# Patient Record
Sex: FEMALE | Race: WHITE | Employment: OTHER | URBAN - METROPOLITAN AREA
[De-identification: names, ages, dates, MRNs, and addresses within clinical notes are randomized per-mention and may not be internally consistent; named-entity substitution may affect disease eponyms.]

---

## 2024-03-08 ENCOUNTER — ANESTHESIA (OUTPATIENT)
Dept: ENDOSCOPY | Age: 79
End: 2024-03-08
Payer: MEDICARE

## 2024-03-08 ENCOUNTER — ANESTHESIA EVENT (OUTPATIENT)
Dept: ENDOSCOPY | Age: 79
End: 2024-03-08
Payer: MEDICARE

## 2024-03-08 ENCOUNTER — HOSPITAL ENCOUNTER (OUTPATIENT)
Age: 79
Setting detail: OUTPATIENT SURGERY
Discharge: HOME | End: 2024-03-08
Attending: INTERNAL MEDICINE | Admitting: INTERNAL MEDICINE
Payer: MEDICARE

## 2024-03-08 VITALS
HEART RATE: 79 BPM | DIASTOLIC BLOOD PRESSURE: 83 MMHG | HEIGHT: 63 IN | OXYGEN SATURATION: 97 % | TEMPERATURE: 97.5 F | BODY MASS INDEX: 46.07 KG/M2 | SYSTOLIC BLOOD PRESSURE: 160 MMHG | WEIGHT: 260 LBS | RESPIRATION RATE: 16 BRPM

## 2024-03-08 DIAGNOSIS — K30 FUNCTIONAL DYSPEPSIA: ICD-10-CM

## 2024-03-08 DIAGNOSIS — Z86.010 HISTORY OF COLON POLYPS: ICD-10-CM

## 2024-03-08 PROCEDURE — 3700000000 HC ANESTHESIA ATTENDED CARE: Performed by: INTERNAL MEDICINE

## 2024-03-08 PROCEDURE — 2709999900 HC NON-CHARGEABLE SUPPLY: Performed by: INTERNAL MEDICINE

## 2024-03-08 PROCEDURE — 3609012400 HC EGD TRANSORAL BIOPSY SINGLE/MULTIPLE: Performed by: INTERNAL MEDICINE

## 2024-03-08 PROCEDURE — 88305 TISSUE EXAM BY PATHOLOGIST: CPT

## 2024-03-08 PROCEDURE — 7100000010 HC PHASE II RECOVERY - FIRST 15 MIN: Performed by: INTERNAL MEDICINE

## 2024-03-08 PROCEDURE — 6360000002 HC RX W HCPCS: Performed by: NURSE ANESTHETIST, CERTIFIED REGISTERED

## 2024-03-08 PROCEDURE — 3609010600 HC COLONOSCOPY POLYPECTOMY SNARE/COLD BIOPSY: Performed by: INTERNAL MEDICINE

## 2024-03-08 PROCEDURE — 2580000003 HC RX 258: Performed by: ANESTHESIOLOGY

## 2024-03-08 PROCEDURE — 7100000011 HC PHASE II RECOVERY - ADDTL 15 MIN: Performed by: INTERNAL MEDICINE

## 2024-03-08 PROCEDURE — 2500000003 HC RX 250 WO HCPCS: Performed by: NURSE ANESTHETIST, CERTIFIED REGISTERED

## 2024-03-08 PROCEDURE — 3700000001 HC ADD 15 MINUTES (ANESTHESIA): Performed by: INTERNAL MEDICINE

## 2024-03-08 RX ORDER — PROPOFOL 10 MG/ML
INJECTION, EMULSION INTRAVENOUS CONTINUOUS PRN
Status: DISCONTINUED | OUTPATIENT
Start: 2024-03-08 | End: 2024-03-08 | Stop reason: SDUPTHER

## 2024-03-08 RX ORDER — SODIUM CHLORIDE, SODIUM LACTATE, POTASSIUM CHLORIDE, CALCIUM CHLORIDE 600; 310; 30; 20 MG/100ML; MG/100ML; MG/100ML; MG/100ML
INJECTION, SOLUTION INTRAVENOUS CONTINUOUS
Status: DISCONTINUED | OUTPATIENT
Start: 2024-03-08 | End: 2024-03-08 | Stop reason: HOSPADM

## 2024-03-08 RX ORDER — SERTRALINE HYDROCHLORIDE 100 MG/1
150 TABLET, FILM COATED ORAL
COMMUNITY

## 2024-03-08 RX ORDER — SOLIFENACIN SUCCINATE 10 MG/1
10 TABLET, FILM COATED ORAL DAILY
COMMUNITY
Start: 2024-01-12

## 2024-03-08 RX ORDER — UBIDECARENONE 100 MG
100 CAPSULE ORAL DAILY
COMMUNITY

## 2024-03-08 RX ORDER — PROPOFOL 10 MG/ML
INJECTION, EMULSION INTRAVENOUS PRN
Status: DISCONTINUED | OUTPATIENT
Start: 2024-03-08 | End: 2024-03-08 | Stop reason: SDUPTHER

## 2024-03-08 RX ORDER — VALSARTAN 80 MG/1
80 TABLET ORAL NIGHTLY
COMMUNITY

## 2024-03-08 RX ORDER — MELOXICAM 15 MG/1
15 TABLET ORAL DAILY
COMMUNITY
Start: 2023-11-24

## 2024-03-08 RX ORDER — LIDOCAINE HYDROCHLORIDE 20 MG/ML
INJECTION, SOLUTION EPIDURAL; INFILTRATION; INTRACAUDAL; PERINEURAL PRN
Status: DISCONTINUED | OUTPATIENT
Start: 2024-03-08 | End: 2024-03-08 | Stop reason: SDUPTHER

## 2024-03-08 RX ORDER — ROSUVASTATIN CALCIUM 10 MG/1
10 TABLET, COATED ORAL EVERY MORNING
COMMUNITY
Start: 2024-02-11

## 2024-03-08 RX ORDER — MILK THISTLE 150 MG
CAPSULE ORAL DAILY
COMMUNITY

## 2024-03-08 RX ADMIN — PROPOFOL 20 MG: 10 INJECTION, EMULSION INTRAVENOUS at 10:09

## 2024-03-08 RX ADMIN — SODIUM CHLORIDE, POTASSIUM CHLORIDE, SODIUM LACTATE AND CALCIUM CHLORIDE: 600; 310; 30; 20 INJECTION, SOLUTION INTRAVENOUS at 08:42

## 2024-03-08 RX ADMIN — PROPOFOL 20 MG: 10 INJECTION, EMULSION INTRAVENOUS at 10:12

## 2024-03-08 RX ADMIN — PROPOFOL 20 MG: 10 INJECTION, EMULSION INTRAVENOUS at 10:04

## 2024-03-08 RX ADMIN — PROPOFOL 75 MCG/KG/MIN: 10 INJECTION, EMULSION INTRAVENOUS at 09:48

## 2024-03-08 RX ADMIN — PROPOFOL 50 MG: 10 INJECTION, EMULSION INTRAVENOUS at 09:48

## 2024-03-08 RX ADMIN — PROPOFOL 10 MG: 10 INJECTION, EMULSION INTRAVENOUS at 10:07

## 2024-03-08 RX ADMIN — PROPOFOL 20 MG: 10 INJECTION, EMULSION INTRAVENOUS at 10:14

## 2024-03-08 RX ADMIN — LIDOCAINE HYDROCHLORIDE 50 MG: 20 INJECTION, SOLUTION EPIDURAL; INFILTRATION; INTRACAUDAL; PERINEURAL at 09:48

## 2024-03-08 RX ADMIN — PROPOFOL 20 MG: 10 INJECTION, EMULSION INTRAVENOUS at 10:16

## 2024-03-08 RX ADMIN — PROPOFOL 50 MG: 10 INJECTION, EMULSION INTRAVENOUS at 09:50

## 2024-03-08 ASSESSMENT — PAIN - FUNCTIONAL ASSESSMENT: PAIN_FUNCTIONAL_ASSESSMENT: 0-10

## 2024-03-08 NOTE — ANESTHESIA PRE PROCEDURE
07/21/2014 09:00 AM    ALKPHOS 63 07/21/2014 09:00 AM    AST 19 07/21/2014 09:00 AM    ALT 20 07/21/2014 09:00 AM       POC Tests: No results for input(s): \"POCGLU\", \"POCNA\", \"POCK\", \"POCCL\", \"POCBUN\", \"POCHEMO\", \"POCHCT\" in the last 72 hours.    Coags: No results found for: \"PROTIME\", \"INR\", \"APTT\"    HCG (If Applicable): No results found for: \"PREGTESTUR\", \"PREGSERUM\", \"HCG\", \"HCGQUANT\"     ABGs: No results found for: \"PHART\", \"PO2ART\", \"YAB0IDI\", \"MKN4YMS\", \"BEART\", \"T4PJJYFY\"     Type & Screen (If Applicable):  No results found for: \"LABABO\", \"LABRH\"    Drug/Infectious Status (If Applicable):  No results found for: \"HIV\", \"HEPCAB\"    COVID-19 Screening (If Applicable): No results found for: \"COVID19\"        Anesthesia Evaluation  Patient summary reviewed and Nursing notes reviewed  Airway: Mallampati: II  TM distance: >3 FB   Neck ROM: full  Mouth opening: > = 3 FB   Dental: normal exam         Pulmonary:Negative Pulmonary ROS and normal exam                               Cardiovascular:  Exercise tolerance: good (>4 METS)  (+) hypertension:, hyperlipidemia                  Neuro/Psych:   Negative Neuro/Psych ROS              GI/Hepatic/Renal:   (+) morbid obesity          Endo/Other:    (+) : arthritis: OA..                 Abdominal:             Vascular: negative vascular ROS.         Other Findings:             Anesthesia Plan      MAC     ASA 3       Induction: intravenous.      Anesthetic plan and risks discussed with patient.      Plan discussed with CRNA.    Attending anesthesiologist reviewed and agrees with Preprocedure content                JACE DANIELS MD   3/8/2024

## 2024-03-08 NOTE — PROCEDURES
EGD/COLONOSCOPY     Patient: Brii Ferrari MRN: [id number]   YOB: 1945 Age: 79 y.o. Sex: female   Unit: [facility] ENDOSCOPY Room/Bed: Endo Pool/NONE Location: [facility]    Admitting Physician: LAIHSA RADFORD     Primary Care Physician: Merritt Montenegro MD      DATE OF PROCEDURE: 3/8/2024  PROCEDURE: EGD/Colonoscopy    PREOPERATIVE DIAGNOSIS: History of colon polyps [Z86.010]  Functional dyspepsia [K30]  HPI: Diagnostic EGD for generalized abd pain.  Colonoscopy for surveillance of high risk colon polyp.      Brii has history of depression, HTN, umbilical hernia, right bundle branch block and low risk colon polyps.  I met her recently with complaints of abdominal discomfort, belching, change in bowel habits.         11/20 colonoscopy by Dr. Perez 2 with 2 small polyps, 2 medium polyps and a large 1.2 cm ascending colon polyp with 3-year recall recommended.  No previous EGD.           We met her a month ago in the office.  for the last 2 years, she awakes with nausea, abd distention, burping, worse after a meal and at night. worse with anxiety. for the last 5 or so years, also has alternating constipation and diarrhea. typically, she has BM every 3-4 days formed or hard. occasional urgent loose or watery.  We started MiraLAX.         Takes meloxicam or ibuprofen and asa. take cimetadine 300mg bid OTC for upper abd discomfort, and it can help.         pshx: rt knee replacement. remote abdominoplasty, abd wall hernia repair. partial hysterectomy and rectocele repair.         fhx: daughter sigmoid volvulus. no other GI disease         shx: non smoker. occ etoh.     ENDOSCOPIST: LAISHA RADFORD MD    POSTOPERATIVE DIAGNOSIS:    -Esophagus notable for LA grade a reflux esophagitis.  -Stomach with small hiatal hernia.  Mild nonerosive antritis, biopsied for H. pylori.  -Duodenum unremarkable to the third portion with biopsies from D2-D3 to rule out celiac.  -Tortuous and technically  Intubated: Yes    Findings:   The esophagus was notable for mild LA grade a reflux esophagitis at the GE junction, which was the same level as the Z-line.  There is no obvious Viera's esophagus.  The diaphragm was about 2 cm distal.  In the stomach, aside from the hiatal hernia, the cardia, fundus, gastric body were unremarkable.  In the antrum, there was mild erythema with a few scattered small erosions.  Biopsies were obtained from the antrum and incisura to rule out H. pylori.  In the duodenum, the first second and third portions were unremarkable.  Biopsies were obtained from D2 and D3 to rule out celiac sprue.  The colon was technically challenging to navigate because of looping in the left colon and tortuosity in the transverse colon.  This was overcome by keeping the scope straight as possible and then in the transverse colon, applying targeted pressure in the super pubic area.  The cecum including the appendix and ileocecal valve were grossly normal.  In the transverse colon, there were 3 polyps ranging in size from 3 to 6 mm, removed and retrieved with a cold snare.  There was moderate left-sided diverticulosis and there were small internal and external hemorrhoids.  The ascending, transverse, descending, sigmoid and rectum were otherwise unremarkable.    Estimated Blood Loss:  Minimal  Complications: None    Signed By: LAISHA RADFORD MD

## 2024-03-08 NOTE — PROGRESS NOTES
03/08/24 0753   Encounter Summary   Encounter Overview/Reason  Initial Encounter   Service Provided For: Patient   Support System Children   Last Encounter  03/08/24  (MSR-  provided spiritual health initial visit. The pt is [employer], leslie supports pt. Pthas dter with her today, for emotional support. 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 No further visits planned.)   Begin Time 0748   End Time  0756   Total Time Calculated 8 min   Spiritual/Emotional needs   Type Spiritual Support     Maritza Manley MDiv., BCC

## 2024-03-08 NOTE — H&P
ovaries left in    OTHER SURGICAL HISTORY  01/01/2014    dental implant    OTHER SURGICAL HISTORY N/A 06/12/2015    Abdominoplasty    TOTAL KNEE ARTHROPLASTY Right       Social History     Socioeconomic History    Marital status:      Spouse name: None    Number of children: None    Years of education: None    Highest education level: None   Tobacco Use    Smoking status: Never    Smokeless tobacco: Never   Substance and Sexual Activity    Alcohol use: Yes     Comment: occasionally 1/ month    Drug use: No      Family History   Problem Relation Age of Onset    Emphysema Father         smoker    Heart Disease Brother     High Blood Pressure Brother     High Cholesterol Brother     Heart Disease Brother     High Cholesterol Brother     High Blood Pressure Brother      Allergies: No Known Allergies  Medications:   Prior to Admission medications    Medication Sig Start Date End Date Taking? Authorizing Provider   solifenacin (VESICARE) 10 MG tablet Take 1 tablet by mouth daily 1/12/24  Yes Maddie Ibanez MD   rosuvastatin (CRESTOR) 10 MG tablet Take 1 tablet by mouth every morning 2/11/24  Yes Maddie Ibanez MD   meloxicam (MOBIC) 15 MG tablet Take 1 tablet by mouth daily 11/24/23  Yes Maddie Ibanez MD   sertraline (ZOLOFT) 100 MG tablet Take 1.5 tablets by mouth    Maddie Ibanez MD   valsartan (DIOVAN) 80 MG tablet Take 1 tablet by mouth nightly    Maddie Ibanez MD   CINNAMON PO by NOT APPLICABLE route    Maddie Ibanez MD   coenzyme Q10 100 MG CAPS capsule Take 1 capsule by mouth daily    Maddie Ibanez MD   CRANBERRY PO Take by mouth    Maddie Ibanez MD   Quercetin 500 MG CAPS Take by mouth daily    Provider, Historical, MD   MISC NATURAL PRODUCT OP   Take by mouth Indications: cholorofil supplement per dr. ramos   Patient not taking: Reported on 3/8/2024    Provider, Historical, MD   MISC NATURAL PRODUCTS PO Take by mouth Indications: a-f beta  per dr. ramos  Patient not taking: Reported on 3/8/2024    Maddie Ibanez MD   CALCIUM CARBONATE-VIT D-MIN PO Take 1 tablet by mouth daily    Maddie Ibanez MD   aspirin 81 MG tablet Take 1 tablet by mouth daily    Maddie Ibanez MD   OMEGA-3 FATTY ACIDS PO Take 720 mg by mouth daily    Maddie Ibanez MD   Cholecalciferol (VITAMIN D3) 5000 UNITS TABS Take by mouth daily    Maddie Ibanez MD   GLUCOS-CHONDROIT-HYALURON-D3 PO Take 1 tablet by mouth 3 times daily  Patient not taking: Reported on 3/8/2024    Maddie Ibanez MD   Ascorbic Acid (VITAMIN C) 500 MG CAPS Take by mouth daily    Maddie Ibanez MD   Multiple Vitamins-Minerals (MULTIVITAMIN PO) Take 1 tablet by mouth daily Vitafusion Multivites    ProviderMaddie MD       Vital Signs: BP (!) 155/77   Pulse 71   Temp 97.3 °F (36.3 °C) (Temporal)   Resp 16   Ht 1.6 m (5' 3\")   Wt 117.9 kg (260 lb)   SpO2 92%   BMI 46.06 kg/m²   Physical Exam:   Heart: regular rrr  Lungs: non-labored breathing  Mental status:  Alert and oriented    ASA score:  ASA 3 - Patient with moderate systemic disease with functional limitations{  Mallimpati score:  2     Planned Procedure: EGD/colonoscopy    Planned Sedation: Conscious sedation / Monitored anesthesia.    Signed By: LAIHSA RADFORD MD   March 8, 2024

## 2024-03-08 NOTE — ANESTHESIA POSTPROCEDURE EVALUATION
Department of Anesthesiology  Postprocedure Note    Patient: Brii Ferrari  MRN: [id number]  YOB: 1945  Date of evaluation: 3/8/2024    Procedure Summary       Date: 03/08/24 Room / Location: [place] [id number] / [facility]    Anesthesia Start: 0944 Anesthesia Stop: 1043    Procedures:       COLONOSCOPY POLYPECTOMY SNARE/COLD BIOPSY      ESOPHAGOGASTRODUODENOSCOPY BIOPSY Diagnosis:       History of colon polyps      Functional dyspepsia      (History of colon polyps [Z86.010])      (Functional dyspepsia [K30])    Surgeons: Paco Azar MD Responsible Provider: Albert May MD    Anesthesia Type: MAC ASA Status: 3            Anesthesia Type: No value filed.    Jesse Phase I: Jesse Score: 10    Jesse Phase II: Jesse Score: 10    Anesthesia Post Evaluation    Patient location during evaluation: PACU  Patient participation: complete - patient participated  Level of consciousness: awake  Pain score: 0  Airway patency: patent  Nausea & Vomiting: no nausea  Cardiovascular status: hemodynamically stable  Respiratory status: acceptable  Hydration status: stable  Pain management: satisfactory to patient    No notable events documented.

## 2024-03-08 NOTE — DISCHARGE INSTRUCTIONS
were unremarkable.  In the antrum, there was mild erythema with a few scattered small erosions.  Biopsies were obtained from the antrum and incisura to rule out H. pylori.  In the duodenum, the first second and third portions were unremarkable.  Biopsies were obtained from D2 and D3 to rule out celiac sprue.  The colon was technically challenging to navigate because of looping in the left colon and tortuosity in the transverse colon.  This was overcome by keeping the scope straight as possible and then in the transverse colon, applying targeted pressure in the super pubic area.  The cecum including the appendix and ileocecal valve were grossly normal.  In the transverse colon, there were 3 polyps ranging in size from 3 to 6 mm, removed and retrieved with a cold snare.  There was moderate left-sided diverticulosis and there were small internal and external hemorrhoids.  The ascending, transverse, descending, sigmoid and rectum were otherwise unremarkable.      Please review these discharge instructions this evening or tomorrow for  information you may have forgotten.            We want to thank you for choosing the [facility] as your health care provider. We always strive to provide you with excellent care while you are here. You may receive a survey in the mail regarding your care. We would appreciate you taking a few minutes of your time to complete this survey.

## 2024-03-08 NOTE — PROGRESS NOTES
Ambulatory Surgery/Procedure Discharge Note    Vitals:    03/08/24 1110   BP: (!) 160/83   Pulse: 79   Resp: 16   Temp: 97.5 °F (36.4 °C)   SpO2: 97%     BP meets james standards  In: 800 [I.V.:800]  Out: -     Restroom use offered before discharge.  Yes    Pain assessment:  none  Pain Level: 0    Denies pain or nausea tolerating cranberry juice well. Pt'matthew heard spoke to Dr Azar.    Patient discharged to home/self care. Patient discharged via wheel chair by transporter to waiting family/S.O.       3/8/2024 11:19 AM

## (undated) DEVICE — SNARE COLD DIAMOND 10MM THIN

## (undated) DEVICE — TRAP SPEC RETRV CLR PLAS POLYP IN LN SUCT QUIK CTCH

## (undated) DEVICE — FORCEPS BX L240CM JAW DIA2.8MM L CAP W/ NDL MIC MESH TOOTH